# Patient Record
Sex: FEMALE | Race: WHITE | NOT HISPANIC OR LATINO
[De-identification: names, ages, dates, MRNs, and addresses within clinical notes are randomized per-mention and may not be internally consistent; named-entity substitution may affect disease eponyms.]

---

## 2018-02-01 ENCOUNTER — TRANSCRIPTION ENCOUNTER (OUTPATIENT)
Age: 83
End: 2018-02-01

## 2018-07-04 ENCOUNTER — EMERGENCY (EMERGENCY)
Facility: HOSPITAL | Age: 83
LOS: 1 days | End: 2018-07-04
Attending: EMERGENCY MEDICINE
Payer: SELF-PAY

## 2018-07-04 VITALS
RESPIRATION RATE: 17 BRPM | OXYGEN SATURATION: 96 % | SYSTOLIC BLOOD PRESSURE: 197 MMHG | HEART RATE: 96 BPM | DIASTOLIC BLOOD PRESSURE: 99 MMHG

## 2018-07-04 VITALS
WEIGHT: 164.91 LBS | TEMPERATURE: 98 F | RESPIRATION RATE: 18 BRPM | HEIGHT: 63 IN | SYSTOLIC BLOOD PRESSURE: 208 MMHG | DIASTOLIC BLOOD PRESSURE: 104 MMHG | OXYGEN SATURATION: 95 % | HEART RATE: 95 BPM

## 2018-07-04 PROCEDURE — 99053 MED SERV 10PM-8AM 24 HR FAC: CPT

## 2018-07-04 PROCEDURE — 99282 EMERGENCY DEPT VISIT SF MDM: CPT | Mod: 25

## 2018-07-04 PROCEDURE — 99282 EMERGENCY DEPT VISIT SF MDM: CPT

## 2018-07-04 NOTE — ED ADULT NURSE NOTE - OBJECTIVE STATEMENT
84y female with hx of htn presents to the ER c/o allergic rxn. pt is alert and oriented x 4 and speaking coherently. Pt states on Monday she had mouth swelling and two sores on the inside of her mouth. pt states she then had a period of throat swelling, pt denies any difficultly breathing during the episode. Pt states she went to urgent care to be seen and they told her she may be having an allergic rxn to her blood pressure medication. pt states she has not taken her BP meds in several days due to the urgent care visit. Pt states that she is here today because her   from [place] [facility] 10[place] ago in 2 weeks and is stressed it could be a similar rxn. pt in nad. md high completed. will reassess.

## 2018-07-04 NOTE — ED PROVIDER NOTE - NS ED ROS FT
CONST: no fevers, no chills  EYES: no pain, no vision changes  ENT: no sore throat, no ear pain, no change in hearing, +lower and upper lip swelling  CV: no chest pain, no leg swelling  RESP: no shortness of breath, no cough  ABD: no abdominal pain, no nausea, no vomiting, no diarrhea  : no dysuria, no flank pain, no hematuria  MSK: no back pain, no extremity pain  NEURO: no headache or additional neurologic complaints  HEME: no easy bleeding  SKIN:  no rash

## 2018-07-04 NOTE — ED PROVIDER NOTE - PHYSICAL EXAMINATION
Anai Morrison, .:   GENERAL: Patient awake alert NAD.  HEENT: Moist mucous membranes, PERRL, EOMI, +left lateral upper lip swelling with vesicular lesions, similar to lower right lateral lip. No signs of sloughing, necrosis, petechia. No airway compromise.  LUNGS: CTAB, no wheezes or crackles.   CARDIAC: RRR, no m/r/g.    ABDOMEN: Soft, NT, ND, No rebound, guarding.  EXT: No edema. No calf tenderness.  NEURO: A&Ox3. Gait normal.    SKIN: Warm and dry.

## 2018-07-04 NOTE — ED PROVIDER NOTE - CARE PLAN
Principal Discharge DX:	HTN (hypertension)  Secondary Diagnosis:	HSV-1 (herpes simplex virus 1) infection

## 2018-07-04 NOTE — ED PROVIDER NOTE - ATTENDING CONTRIBUTION TO CARE
Patient presenting with lip swelling, began a few days ago.  Believed it was secondary to her BP medication (which she has been taking for years without recent change) so stopped taking it.  No associated throat closing, chest pains, shortness of breath, nausea, vomiting or diarrhea.    On exam patient hypertensive otherwise vital signs within normal limits, airway patent, vesicular eruptions on lower and upper inner lip in groups consistent with hsv-1, no tongue edema, uvula midline, RRR S1/S2, lungs clear to ascultation bilaterally.    Hypertension likely due to self DC'ing medications, no symptoms suggestive of hypertensive emergency requiring further workup, lip swelling consistent with HSV-1 as above (per family patient's  had history of similar episodes so patient does have past exposure), no evidence of anaphylaxis or angioedema requiring treatment, will recommend restarting home BP medications and follow up with PMD.

## 2018-07-04 NOTE — ED PROVIDER NOTE - OBJECTIVE STATEMENT
84F h/o HTN presents with lip swelling and throat swelling (no SOB) which began 2 days ago after taking naprosyn and prilosec for back pain, then benadryl after she noted the swelling. She currently only has swelling of the lower and upper lip. She also went to an urgent care where they told her it may be because of her BP med Avalide (Irbesartan-Hydrochlorothiazide), so she stopped taking it, after which she noted her BP to go up to 200 systolic so she came here to the ER. She is asymptomatic (other than the persistent, painless lip swelling). Of note, her   from [facility] 10 years ago so she is anxious about med reactions. No fevers, headache, N/V/D, SOB.

## 2018-07-04 NOTE — ED ADULT NURSE NOTE - DISCHARGE TEACHING
pt educated by MD bowman to start taking bp meds again- pt to follow up with PCP, referral list provided

## 2018-07-04 NOTE — ED PROVIDER NOTE - MEDICAL DECISION MAKING DETAILS
84F p/w lip swelling. On exam concern for HSV. No signs of airway compromise, no wheezing. Otherwise healthy. No signs of HTN urgency/emergency. Will send home with PMD f/u. and return precautions.

## 2018-07-10 ENCOUNTER — RESULT REVIEW (OUTPATIENT)
Age: 83
End: 2018-07-10

## 2021-03-16 PROBLEM — Z00.00 ENCOUNTER FOR PREVENTIVE HEALTH EXAMINATION: Status: ACTIVE | Noted: 2021-03-16

## 2021-07-07 ENCOUNTER — NON-APPOINTMENT (OUTPATIENT)
Age: 86
End: 2021-07-07

## 2021-07-08 ENCOUNTER — APPOINTMENT (OUTPATIENT)
Dept: ENDOCRINOLOGY | Facility: CLINIC | Age: 86
End: 2021-07-08

## 2021-11-17 ENCOUNTER — NON-APPOINTMENT (OUTPATIENT)
Age: 86
End: 2021-11-17

## 2021-12-23 ENCOUNTER — APPOINTMENT (OUTPATIENT)
Dept: NEPHROLOGY | Facility: CLINIC | Age: 86
End: 2021-12-23

## 2022-02-22 ENCOUNTER — RESULT REVIEW (OUTPATIENT)
Age: 87
End: 2022-02-22

## 2022-03-23 ENCOUNTER — APPOINTMENT (OUTPATIENT)
Dept: NEPHROLOGY | Facility: CLINIC | Age: 87
End: 2022-03-23
Payer: MEDICARE

## 2022-03-23 ENCOUNTER — TRANSCRIPTION ENCOUNTER (OUTPATIENT)
Age: 87
End: 2022-03-23

## 2022-03-23 VITALS
OXYGEN SATURATION: 98 % | HEIGHT: 62 IN | HEART RATE: 62 BPM | SYSTOLIC BLOOD PRESSURE: 150 MMHG | WEIGHT: 174 LBS | DIASTOLIC BLOOD PRESSURE: 84 MMHG | BODY MASS INDEX: 32.02 KG/M2 | TEMPERATURE: 97.2 F

## 2022-03-23 DIAGNOSIS — E79.0 HYPERURICEMIA W/OUT SIGNS OF INFLAMMATORY ARTHRITIS AND TOPHACEOUS DISEASE: ICD-10-CM

## 2022-03-23 DIAGNOSIS — E55.9 VITAMIN D DEFICIENCY, UNSPECIFIED: ICD-10-CM

## 2022-03-23 DIAGNOSIS — N18.30 CHRONIC KIDNEY DISEASE, STAGE 3 UNSPECIFIED: ICD-10-CM

## 2022-03-23 DIAGNOSIS — E21.3 HYPERPARATHYROIDISM, UNSPECIFIED: ICD-10-CM

## 2022-03-23 DIAGNOSIS — G47.33 OBSTRUCTIVE SLEEP APNEA (ADULT) (PEDIATRIC): ICD-10-CM

## 2022-03-23 PROCEDURE — 99205 OFFICE O/P NEW HI 60 MIN: CPT

## 2022-03-23 NOTE — ASSESSMENT
[FreeTextEntry1] : - hx of key, hypercalcemia,hyperuricemia, HTN, snoring\par - bmet, cbc, ua, pth, uric acid reviewed\par - snoring\par \par - refer for sleep study\par - cont Vit D\par - hyperuricemia - reluctant to start allopurinol 2/2 husbands death 2/2 TEN\par - hyperpara - 2/2 hypovitamindosis\par - refer for sleep study

## 2022-03-23 NOTE — HISTORY OF PRESENT ILLNESS
[FreeTextEntry1] : Pleasant 89 yo female accompanied by  son ( MD) and DIL - \par \par Here for eval of hypercalcemia, hyperkalemia, hyperpara, possible kidney stone, dehydration\par - labs done most recently 3/23/22\par cr  1.39 ( fluctuating between 1.1-1.65)\par urate 8.7\par k 4.3\par sna 139\par alb 4\par tc 233\par hdl 38\par ldl 126\par vit D level \par PTH 97-->147\par \par Sleeps all the time, snores\par Fluid intake has improved\par is taking Vit D\par

## 2022-03-23 NOTE — PHYSICAL EXAM
[General Appearance - Alert] : alert [General Appearance - Well Nourished] : well nourished [Sclera] : the sclera and conjunctiva were normal [PERRL With Normal Accommodation] : pupils were equal in size, round, and reactive to light [Extraocular Movements] : extraocular movements were intact [Outer Ear] : the ears and nose were normal in appearance [Hearing Threshold Finger Rub Not [place]] : hearing was normal [Neck Appearance] : the appearance of the neck was normal [Exaggerated Use Of Accessory Muscles For Inspiration] : no accessory muscle use [Heart Sounds] : normal S1 and S2 [Edema] : there was no peripheral edema [Veins - Varicosity Changes] : there were no varicosital changes [Bowel Sounds] : normal bowel sounds [Abdomen Soft] : soft [No CVA Tenderness] : no ~M costovertebral angle tenderness [No Spinal Tenderness] : no spinal tenderness [Skin Color & Pigmentation] : normal skin color and pigmentation [] : no rash [Cranial Nerves] : cranial nerves 2-12 were intact [Sensation] : the sensory exam was normal to light touch and pinprick [Oriented To Time, Place, And Person] : oriented to person, place, and time [Impaired Insight] : insight and judgment were intact

## 2022-06-22 ENCOUNTER — APPOINTMENT (OUTPATIENT)
Dept: NEPHROLOGY | Facility: CLINIC | Age: 87
End: 2022-06-22

## 2022-06-23 ENCOUNTER — RESULT REVIEW (OUTPATIENT)
Age: 87
End: 2022-06-23

## 2022-06-24 ENCOUNTER — RESULT REVIEW (OUTPATIENT)
Age: 87
End: 2022-06-24

## 2022-06-25 ENCOUNTER — RESULT REVIEW (OUTPATIENT)
Age: 87
End: 2022-06-25

## 2022-06-28 ENCOUNTER — RESULT REVIEW (OUTPATIENT)
Age: 87
End: 2022-06-28

## 2022-06-29 ENCOUNTER — RESULT REVIEW (OUTPATIENT)
Age: 87
End: 2022-06-29

## 2022-07-07 ENCOUNTER — RESULT REVIEW (OUTPATIENT)
Age: 87
End: 2022-07-07

## 2022-07-08 ENCOUNTER — RESULT REVIEW (OUTPATIENT)
Age: 87
End: 2022-07-08

## 2022-07-11 ENCOUNTER — RESULT REVIEW (OUTPATIENT)
Age: 87
End: 2022-07-11

## 2022-07-15 ENCOUNTER — TRANSCRIPTION ENCOUNTER (OUTPATIENT)
Age: 87
End: 2022-07-15

## 2022-07-19 PROBLEM — Z00.00 ENCOUNTER FOR PREVENTIVE HEALTH EXAMINATION: Status: ACTIVE | Noted: 2022-07-19

## 2022-08-01 ENCOUNTER — APPOINTMENT (OUTPATIENT)
Dept: NEUROSURGERY | Facility: CLINIC | Age: 87
End: 2022-08-01

## 2022-08-01 NOTE — HISTORY OF PRESENT ILLNESS
[FreeTextEntry1] : GRACIE MARIN is a 88 year female with a PMH of mild dementia, gout, HTN, known T9 meningioma who presents to the office today for neurosurgical follow up of  recent hospitalization at [facility] from 6/23/22 to 7/15/22 for drooling, slurred speech, progressive weakness of lower extremities R>L, worsening gait, and fall at the [facility] where she is a resident.  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  She additionally was found to have small acute pontine infarcts on MRI brain done in ED.  She was taken to the OR for urgent T9 laminectomy and resection of intradural extramedullary tumor on 6/24/22.  Frozen section was consistent with meningioma.  Her postoperative hospital course was notable for dysphagia and iatrogenic pneumothorax from NGT placement s/p chest tube insertion and later removal, Proteus UTI, and postoperative superficial surgical wound infection s/p IR guided aspiration of subfascial fluid collection-final cultures all negative, treated with short course of IV Vanco/Linezolid and dc'ed on 7 day course of clindamycin.  She was started on 21 days of DAPT for her stroke during hospitalization without any bleeding complication postoperatively.  She was discharged to [facility] on 7/15/22.  \par Final pathology report revealed Meningioma WHO Grade I \par Today, she reports ***  She denies numbness, tingling, back pain, fever, further wound dehiscence.  \par  \par Meds on D/c: Amlodipine, ASA 81, Atorvastatin 80, Plavix, Clindamycin, Colchicine, Escitalopram, Lovenox 30, Lisinopril, Pantoprazole, Florastor\par Allergies: citrus, casein, doxycycline, iodine, lactose, naproxen, omeprazole\par Soc Hx: nonsmoker, no EtOH, currently lives ***\par

## 2022-08-01 NOTE — REASON FOR VISIT
[de-identified] : T9 Laminectomy for resection of spinal intradural extrameduallary tumor [de-identified] : 6/24/22 [de-identified] : 38 [de-identified] : 5

## 2022-08-01 NOTE — ASSESSMENT
[FreeTextEntry1] : Mrs. Levy has been doing well at [facility] since her discharge from [facility] on 7/15/22.  She has completed her course of antibiotics and her wound is healing well.  She should continue PT/OT as scheduled and she should follow up with Dr. Pike/Stroke Neurology for her recent pontine infarcts.  She may continue antiplatelet agent for secondary stroke prevention as outlined by the Neurology team.  She should follow up with us in 3 month's time to reassess her progress.  \par The patient and her son understand the plan of care and are in agreement.  All questions answered to patient's son, Dr. Levy' satisfaction.\par

## 2022-08-01 NOTE — PHYSICAL EXAM
[Longitudinal] : longitudinal [Clean] : clean [Healing Well] : healing well [No Drainage] : without drainage [General Appearance - Alert] : alert [General Appearance - In No Acute Distress] : in no acute distress [Dry] : dry [Intact] : intact [Erythema] : not erythematous [Warm] : not warm [Fluctuant] : not fluctuant [FreeTextEntry1] : Mid back

## 2022-08-23 PROBLEM — D32.1 MENINGIOMA, SPINAL: Status: ACTIVE | Noted: 2022-08-23

## 2022-08-26 NOTE — HISTORY OF PRESENT ILLNESS
[FreeTextEntry1] : GRACIE MARIN is a 88 year old female with a PMH of  HTN, mild dementia who presents to the office today for post-operative follow up after recently hospitalization at [facility] 6/23/22 - 7/15/22. She presented with 48 hours of right lower extremity weakness and confusion. She was found to have T9 spinal cord compression due to a known 1.6 cm meningioma and underwent surgical decompression with T9 laminectomy for tumor resection on 6/24/22. Her hospital course was complicated by altered mental status on 6/27 prompting stoke code, MRI significant for right pontine infarct (present on previous imaging) and new left thalamic infarct, as well as trace intraventricular hemorrhage in posterior horns,  right apical pneumothorax requiring chest tube placement on 7/5/22, and surgical wound drainage requiring US guided aspiration by Dr. Caputo on 7/11/22, no growth on cultures, 7 days of abx completed. She was deemed stable for discharged to [facility] on 7/15/22. \par Today she reports she is feeling well. She was discharged home from [facility] on ** Her right lower extremity weakness has***\par The patient denies new weakness, numbness, fevers, chills, or incisional swelling, erythema or drainage.

## 2022-08-29 ENCOUNTER — APPOINTMENT (OUTPATIENT)
Dept: NEUROLOGY | Facility: CLINIC | Age: 87
End: 2022-08-29

## 2022-08-29 ENCOUNTER — APPOINTMENT (OUTPATIENT)
Dept: NEUROSURGERY | Facility: CLINIC | Age: 87
End: 2022-08-29

## 2022-08-29 DIAGNOSIS — D32.1 BENIGN NEOPLASM OF SPINAL MENINGES: ICD-10-CM

## 2025-02-04 NOTE — ED ADULT NURSE NOTE - CAS DISCH TRANSFER METHOD
Hello,  This patient has been scheduled for recall colonoscopy with Dr. Canada on 03/11 at [facility]. Please send Nulytely to patient's selected pharmacy 10 days prior to procedure.  Thanks, RJ   Private car